# Patient Record
Sex: FEMALE | Race: WHITE
[De-identification: names, ages, dates, MRNs, and addresses within clinical notes are randomized per-mention and may not be internally consistent; named-entity substitution may affect disease eponyms.]

---

## 2021-05-03 PROBLEM — Z00.00 ENCOUNTER FOR PREVENTIVE HEALTH EXAMINATION: Status: ACTIVE | Noted: 2021-05-03

## 2021-05-04 ENCOUNTER — APPOINTMENT (OUTPATIENT)
Dept: GYNECOLOGIC ONCOLOGY | Facility: CLINIC | Age: 71
End: 2021-05-04
Payer: MEDICARE

## 2021-05-04 ENCOUNTER — RESULT REVIEW (OUTPATIENT)
Age: 71
End: 2021-05-04

## 2021-05-04 VITALS
TEMPERATURE: 97.8 F | BODY MASS INDEX: 30.92 KG/M2 | WEIGHT: 197 LBS | DIASTOLIC BLOOD PRESSURE: 82 MMHG | HEIGHT: 67 IN | SYSTOLIC BLOOD PRESSURE: 124 MMHG

## 2021-05-04 PROCEDURE — 99203 OFFICE O/P NEW LOW 30 MIN: CPT

## 2021-05-04 NOTE — ASSESSMENT
[FreeTextEntry1] : 71 year old  ( x1) presents for annual Gynecological health maintenance visit.    Patient [place] and has [place].   She also claims to have ovarian cyst and needs further f/u.

## 2021-11-09 ENCOUNTER — APPOINTMENT (OUTPATIENT)
Dept: GYNECOLOGIC ONCOLOGY | Facility: CLINIC | Age: 71
End: 2021-11-09
Payer: MEDICARE

## 2021-11-09 VITALS
HEIGHT: 67 IN | SYSTOLIC BLOOD PRESSURE: 136 MMHG | TEMPERATURE: 98.2 F | BODY MASS INDEX: 30.92 KG/M2 | WEIGHT: 197 LBS | DIASTOLIC BLOOD PRESSURE: 80 MMHG

## 2021-11-09 DIAGNOSIS — R92.8 OTHER ABNORMAL AND INCONCLUSIVE FINDINGS ON DIAGNOSTIC IMAGING OF BREAST: ICD-10-CM

## 2021-11-09 DIAGNOSIS — N83.209 UNSPECIFIED OVARIAN CYST, UNSPECIFIED SIDE: ICD-10-CM

## 2021-11-09 PROCEDURE — 99214 OFFICE O/P EST MOD 30 MIN: CPT

## 2021-11-09 NOTE — ASSESSMENT
[FreeTextEntry1] : 71 year old  ( x1), followed for several years for small simple ovarian cysts, now presents for further f/U evaluation.  Pelvic Sonogram reports bilateral  simple ovarian cysts.\par Right ovary has 1.2 cm cyst and left ovary is  0.7cm.   There are also stable uterine fibroids.   No fluid in cul-de-sac.   She denies any pain, vaginal bleeding or discharge.  The patient was recently diagnosed with Right-sided breast cancer.   She underwent lumpectomy with radiation in [place], where she resides.   She presents for her annual health maintenance examination. The patient appears to be doing well with no clinical evidence of disease.\par  I informed her to consider genetic testing and discuss it with her oncologist in [place].

## 2021-11-09 NOTE — HISTORY OF PRESENT ILLNESS
[FreeTextEntry1] : 71 year old  ( x1), followed for several years for small simple ovarian cysts, now presents for further evaluation.  Pelvic Sonogram reports bilateral  simple ovarian cysts.\par Right ovary has 1.2 cm cyst and left ovary is  0.7cm.   There are also stable uterine fibroids.   No fluid in cul-de-sac.   She denies any pain, vaginal bleeding or discharge.  The patient was recently diagnosed with Right-sided breast cancer.   She underwent lumpectomy with radiation in [place], where she resides.   She presents for her annual health maintenance examination.  \par

## 2022-11-11 ENCOUNTER — APPOINTMENT (OUTPATIENT)
Dept: GYNECOLOGIC ONCOLOGY | Facility: CLINIC | Age: 72
End: 2022-11-11